# Patient Record
Sex: FEMALE | Race: WHITE | NOT HISPANIC OR LATINO
[De-identification: names, ages, dates, MRNs, and addresses within clinical notes are randomized per-mention and may not be internally consistent; named-entity substitution may affect disease eponyms.]

---

## 2021-09-21 ENCOUNTER — APPOINTMENT (OUTPATIENT)
Dept: OPHTHALMOLOGY | Facility: CLINIC | Age: 46
End: 2021-09-21

## 2023-10-04 ENCOUNTER — NON-APPOINTMENT (OUTPATIENT)
Age: 48
End: 2023-10-04

## 2023-10-10 ENCOUNTER — NON-APPOINTMENT (OUTPATIENT)
Age: 48
End: 2023-10-10

## 2023-10-11 ENCOUNTER — NON-APPOINTMENT (OUTPATIENT)
Age: 48
End: 2023-10-11

## 2023-10-11 ENCOUNTER — APPOINTMENT (OUTPATIENT)
Dept: GYNECOLOGIC ONCOLOGY | Facility: CLINIC | Age: 48
End: 2023-10-11
Payer: COMMERCIAL

## 2023-10-11 VITALS
WEIGHT: 135.25 LBS | DIASTOLIC BLOOD PRESSURE: 84 MMHG | BODY MASS INDEX: 23.09 KG/M2 | SYSTOLIC BLOOD PRESSURE: 139 MMHG | HEART RATE: 67 BPM | HEIGHT: 64 IN

## 2023-10-11 DIAGNOSIS — Z80.1 FAMILY HISTORY OF MALIGNANT NEOPLASM OF TRACHEA, BRONCHUS AND LUNG: ICD-10-CM

## 2023-10-11 PROCEDURE — 99204 OFFICE O/P NEW MOD 45 MIN: CPT

## 2023-10-12 ENCOUNTER — TRANSCRIPTION ENCOUNTER (OUTPATIENT)
Age: 48
End: 2023-10-12

## 2023-10-24 ENCOUNTER — OUTPATIENT (OUTPATIENT)
Dept: OUTPATIENT SERVICES | Facility: HOSPITAL | Age: 48
LOS: 1 days | End: 2023-10-24
Payer: COMMERCIAL

## 2023-10-24 ENCOUNTER — TRANSCRIPTION ENCOUNTER (OUTPATIENT)
Age: 48
End: 2023-10-24

## 2023-10-24 DIAGNOSIS — C80.1 MALIGNANT (PRIMARY) NEOPLASM, UNSPECIFIED: ICD-10-CM

## 2023-10-25 ENCOUNTER — RESULT REVIEW (OUTPATIENT)
Age: 48
End: 2023-10-25

## 2023-10-25 PROCEDURE — 88321 CONSLTJ&REPRT SLD PREP ELSWR: CPT

## 2023-10-25 PROCEDURE — 88175 CYTOPATH C/V AUTO FLUID REDO: CPT

## 2023-10-25 PROCEDURE — 88141 CYTOPATH C/V INTERPRET: CPT

## 2023-10-26 LAB
SURGICAL PATHOLOGY STUDY: SIGNIFICANT CHANGE UP
SURGICAL PATHOLOGY STUDY: SIGNIFICANT CHANGE UP

## 2023-10-27 ENCOUNTER — RESULT REVIEW (OUTPATIENT)
Age: 48
End: 2023-10-27

## 2023-11-09 ENCOUNTER — TRANSCRIPTION ENCOUNTER (OUTPATIENT)
Age: 48
End: 2023-11-09

## 2023-11-10 ENCOUNTER — OUTPATIENT (OUTPATIENT)
Dept: OUTPATIENT SERVICES | Facility: HOSPITAL | Age: 48
LOS: 1 days | End: 2023-11-10

## 2023-11-10 VITALS
WEIGHT: 128.97 LBS | SYSTOLIC BLOOD PRESSURE: 128 MMHG | RESPIRATION RATE: 15 BRPM | TEMPERATURE: 99 F | HEART RATE: 67 BPM | DIASTOLIC BLOOD PRESSURE: 84 MMHG | OXYGEN SATURATION: 99 % | HEIGHT: 63.5 IN

## 2023-11-10 DIAGNOSIS — Z90.49 ACQUIRED ABSENCE OF OTHER SPECIFIED PARTS OF DIGESTIVE TRACT: Chronic | ICD-10-CM

## 2023-11-10 DIAGNOSIS — D06.9 CARCINOMA IN SITU OF CERVIX, UNSPECIFIED: ICD-10-CM

## 2023-11-10 DIAGNOSIS — Z98.891 HISTORY OF UTERINE SCAR FROM PREVIOUS SURGERY: Chronic | ICD-10-CM

## 2023-11-10 DIAGNOSIS — Z90.89 ACQUIRED ABSENCE OF OTHER ORGANS: Chronic | ICD-10-CM

## 2023-11-10 DIAGNOSIS — J45.909 UNSPECIFIED ASTHMA, UNCOMPLICATED: ICD-10-CM

## 2023-11-10 LAB
ALBUMIN SERPL ELPH-MCNC: 4.3 G/DL — SIGNIFICANT CHANGE UP (ref 3.3–5)
ALBUMIN SERPL ELPH-MCNC: 4.3 G/DL — SIGNIFICANT CHANGE UP (ref 3.3–5)
ALP SERPL-CCNC: 72 U/L — SIGNIFICANT CHANGE UP (ref 40–120)
ALP SERPL-CCNC: 72 U/L — SIGNIFICANT CHANGE UP (ref 40–120)
ALT FLD-CCNC: 12 U/L — SIGNIFICANT CHANGE UP (ref 4–33)
ALT FLD-CCNC: 12 U/L — SIGNIFICANT CHANGE UP (ref 4–33)
ANION GAP SERPL CALC-SCNC: 9 MMOL/L — SIGNIFICANT CHANGE UP (ref 7–14)
ANION GAP SERPL CALC-SCNC: 9 MMOL/L — SIGNIFICANT CHANGE UP (ref 7–14)
AST SERPL-CCNC: 18 U/L — SIGNIFICANT CHANGE UP (ref 4–32)
AST SERPL-CCNC: 18 U/L — SIGNIFICANT CHANGE UP (ref 4–32)
BILIRUB SERPL-MCNC: 1.2 MG/DL — SIGNIFICANT CHANGE UP (ref 0.2–1.2)
BILIRUB SERPL-MCNC: 1.2 MG/DL — SIGNIFICANT CHANGE UP (ref 0.2–1.2)
BUN SERPL-MCNC: 9 MG/DL — SIGNIFICANT CHANGE UP (ref 7–23)
BUN SERPL-MCNC: 9 MG/DL — SIGNIFICANT CHANGE UP (ref 7–23)
CALCIUM SERPL-MCNC: 9.2 MG/DL — SIGNIFICANT CHANGE UP (ref 8.4–10.5)
CALCIUM SERPL-MCNC: 9.2 MG/DL — SIGNIFICANT CHANGE UP (ref 8.4–10.5)
CHLORIDE SERPL-SCNC: 104 MMOL/L — SIGNIFICANT CHANGE UP (ref 98–107)
CHLORIDE SERPL-SCNC: 104 MMOL/L — SIGNIFICANT CHANGE UP (ref 98–107)
CO2 SERPL-SCNC: 25 MMOL/L — SIGNIFICANT CHANGE UP (ref 22–31)
CO2 SERPL-SCNC: 25 MMOL/L — SIGNIFICANT CHANGE UP (ref 22–31)
CREAT SERPL-MCNC: 0.78 MG/DL — SIGNIFICANT CHANGE UP (ref 0.5–1.3)
CREAT SERPL-MCNC: 0.78 MG/DL — SIGNIFICANT CHANGE UP (ref 0.5–1.3)
EGFR: 95 ML/MIN/1.73M2 — SIGNIFICANT CHANGE UP
EGFR: 95 ML/MIN/1.73M2 — SIGNIFICANT CHANGE UP
GLUCOSE SERPL-MCNC: 84 MG/DL — SIGNIFICANT CHANGE UP (ref 70–99)
GLUCOSE SERPL-MCNC: 84 MG/DL — SIGNIFICANT CHANGE UP (ref 70–99)
HCG SERPL-ACNC: <1 MIU/ML — SIGNIFICANT CHANGE UP
HCG SERPL-ACNC: <1 MIU/ML — SIGNIFICANT CHANGE UP
HCT VFR BLD CALC: 38.8 % — SIGNIFICANT CHANGE UP (ref 34.5–45)
HCT VFR BLD CALC: 38.8 % — SIGNIFICANT CHANGE UP (ref 34.5–45)
HGB BLD-MCNC: 12.7 G/DL — SIGNIFICANT CHANGE UP (ref 11.5–15.5)
HGB BLD-MCNC: 12.7 G/DL — SIGNIFICANT CHANGE UP (ref 11.5–15.5)
MCHC RBC-ENTMCNC: 29.1 PG — SIGNIFICANT CHANGE UP (ref 27–34)
MCHC RBC-ENTMCNC: 29.1 PG — SIGNIFICANT CHANGE UP (ref 27–34)
MCHC RBC-ENTMCNC: 32.7 GM/DL — SIGNIFICANT CHANGE UP (ref 32–36)
MCHC RBC-ENTMCNC: 32.7 GM/DL — SIGNIFICANT CHANGE UP (ref 32–36)
MCV RBC AUTO: 88.8 FL — SIGNIFICANT CHANGE UP (ref 80–100)
MCV RBC AUTO: 88.8 FL — SIGNIFICANT CHANGE UP (ref 80–100)
NRBC # BLD: 0 /100 WBCS — SIGNIFICANT CHANGE UP (ref 0–0)
NRBC # BLD: 0 /100 WBCS — SIGNIFICANT CHANGE UP (ref 0–0)
NRBC # FLD: 0 K/UL — SIGNIFICANT CHANGE UP (ref 0–0)
NRBC # FLD: 0 K/UL — SIGNIFICANT CHANGE UP (ref 0–0)
PLATELET # BLD AUTO: 335 K/UL — SIGNIFICANT CHANGE UP (ref 150–400)
PLATELET # BLD AUTO: 335 K/UL — SIGNIFICANT CHANGE UP (ref 150–400)
POTASSIUM SERPL-MCNC: 4.5 MMOL/L — SIGNIFICANT CHANGE UP (ref 3.5–5.3)
POTASSIUM SERPL-MCNC: 4.5 MMOL/L — SIGNIFICANT CHANGE UP (ref 3.5–5.3)
POTASSIUM SERPL-SCNC: 4.5 MMOL/L — SIGNIFICANT CHANGE UP (ref 3.5–5.3)
POTASSIUM SERPL-SCNC: 4.5 MMOL/L — SIGNIFICANT CHANGE UP (ref 3.5–5.3)
PROT SERPL-MCNC: 6.9 G/DL — SIGNIFICANT CHANGE UP (ref 6–8.3)
PROT SERPL-MCNC: 6.9 G/DL — SIGNIFICANT CHANGE UP (ref 6–8.3)
RBC # BLD: 4.37 M/UL — SIGNIFICANT CHANGE UP (ref 3.8–5.2)
RBC # BLD: 4.37 M/UL — SIGNIFICANT CHANGE UP (ref 3.8–5.2)
RBC # FLD: 12.2 % — SIGNIFICANT CHANGE UP (ref 10.3–14.5)
RBC # FLD: 12.2 % — SIGNIFICANT CHANGE UP (ref 10.3–14.5)
SODIUM SERPL-SCNC: 138 MMOL/L — SIGNIFICANT CHANGE UP (ref 135–145)
SODIUM SERPL-SCNC: 138 MMOL/L — SIGNIFICANT CHANGE UP (ref 135–145)
WBC # BLD: 3.84 K/UL — SIGNIFICANT CHANGE UP (ref 3.8–10.5)
WBC # BLD: 3.84 K/UL — SIGNIFICANT CHANGE UP (ref 3.8–10.5)
WBC # FLD AUTO: 3.84 K/UL — SIGNIFICANT CHANGE UP (ref 3.8–10.5)
WBC # FLD AUTO: 3.84 K/UL — SIGNIFICANT CHANGE UP (ref 3.8–10.5)

## 2023-11-10 RX ORDER — SODIUM CHLORIDE 9 MG/ML
1000 INJECTION, SOLUTION INTRAVENOUS
Refills: 0 | Status: DISCONTINUED | OUTPATIENT
Start: 2023-11-13 | End: 2023-11-27

## 2023-11-10 NOTE — H&P PST ADULT - NSANTHAGERD_ENT_A_CORE
RX PROGRESS NOTE: Vancomycin Therapeutic Drug Monitoring      Indication for therapy: Non-necrotizing SSTI    ALLERGIES:  No Known Allergies    Most recent height and weight information:  Weight: 117.8 kg (11/28/22 2000)  Height: 5' 7\" (170.2 cm) (11/28/22 2000)    The Following are the calculated  Current Weights for Kika Dubon     Adjusted Ideal    84.1 kg 61.6 kg             Labs:  Serum Creatinine and Creatinine Clearance:  Serum creatinine: 0.8 mg/dL 11/28/22 1508  Estimated creatinine clearance: 89.4 mL/min    Maximum Temperature (last 24 hours)     Value Max    Temp 101.3 °F (38.5 °C)        WBC (K/mcL)   Date/Time Value   11/28/2022 1508 16.0 (H)     Microbiology Results     None            Assessment/Plan:  Briefly, this is a 70 year old female started on vancomycin for Non-necrotizing SSTI, with a target serum trough concentration of 10-15 mcg/mL.  Patient received a dose of vancomycin 1750 mg at 1741.  Initial dosing regimen will be 1500 mg every 12 hours (maintenance dose).    Pharmacy will monitor levels as appropriate.    Pharmacy will continue to monitor patient (renal function, microbiology data, risk factors for adverse events, appropriate duration of therapy), will order/monitor serum levels as appropriate, and will adjust dose if/when necessary.      Thank you,    Feliz Cervantes, PHARMD  11/28/2022 11:11 PM     No

## 2023-11-10 NOTE — H&P PST ADULT - HISTORY OF PRESENT ILLNESS
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

## 2023-11-10 NOTE — H&P PST ADULT - GENITOURINARY COMMENTS
patient reports of post coital bleeding- s/p sono gram found to have cervical polyps and abnormal PAP smear, s/p colposcopy pre op diagnosis endocervical carcinoma

## 2023-11-10 NOTE — H&P PST ADULT - PROBLEM SELECTOR PLAN 1
Patient is tentatively scheduled cold knife cone biopsy with Dr Mar on 11/13/23    Pre-op instructions provided. Pt given verbal and written instructions with teach back on pepcid. Pt verbalized understanding with return demonstration.    Urine cup provided for day of procedure pregnancy test.    CBC,CMP,HCG sent

## 2023-11-10 NOTE — ASU PATIENT PROFILE, ADULT - FALL HARM RISK - UNIVERSAL INTERVENTIONS
Bed in lowest position, wheels locked, appropriate side rails in place/Call bell, personal items and telephone in reach/Instruct patient to call for assistance before getting out of bed or chair/Non-slip footwear when patient is out of bed/[place] to call system/Physically safe environment - no spills, clutter or unnecessary equipment/Purposeful Proactive Rounding/Room/bathroom lighting operational, light cord in reach

## 2023-11-10 NOTE — H&P PST ADULT - ASSESSMENT
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

## 2023-11-10 NOTE — H&P PST ADULT - NSANTHOSAYNRD_GEN_A_CORE
No. MAGNOLIA screening performed.  STOP BANG Legend: 0-2 = LOW Risk; 3-4 = INTERMEDIATE Risk; 5-8 = HIGH Risk

## 2023-11-12 ENCOUNTER — TRANSCRIPTION ENCOUNTER (OUTPATIENT)
Age: 48
End: 2023-11-12

## 2023-11-13 ENCOUNTER — RESULT REVIEW (OUTPATIENT)
Age: 48
End: 2023-11-13

## 2023-11-13 ENCOUNTER — TRANSCRIPTION ENCOUNTER (OUTPATIENT)
Age: 48
End: 2023-11-13

## 2023-11-13 ENCOUNTER — APPOINTMENT (OUTPATIENT)
Dept: GYNECOLOGIC ONCOLOGY | Facility: HOSPITAL | Age: 48
End: 2023-11-13

## 2023-11-13 ENCOUNTER — OUTPATIENT (OUTPATIENT)
Dept: OUTPATIENT SERVICES | Facility: HOSPITAL | Age: 48
LOS: 1 days | Discharge: HOME | End: 2023-11-13
Payer: COMMERCIAL

## 2023-11-13 VITALS
RESPIRATION RATE: 15 BRPM | OXYGEN SATURATION: 100 % | HEART RATE: 68 BPM | DIASTOLIC BLOOD PRESSURE: 73 MMHG | SYSTOLIC BLOOD PRESSURE: 128 MMHG | TEMPERATURE: 97 F

## 2023-11-13 VITALS
TEMPERATURE: 98 F | HEIGHT: 63.5 IN | WEIGHT: 128.97 LBS | OXYGEN SATURATION: 100 % | SYSTOLIC BLOOD PRESSURE: 105 MMHG | RESPIRATION RATE: 14 BRPM | DIASTOLIC BLOOD PRESSURE: 66 MMHG | HEART RATE: 78 BPM

## 2023-11-13 DIAGNOSIS — Z98.891 HISTORY OF UTERINE SCAR FROM PREVIOUS SURGERY: Chronic | ICD-10-CM

## 2023-11-13 DIAGNOSIS — D06.9 CARCINOMA IN SITU OF CERVIX, UNSPECIFIED: ICD-10-CM

## 2023-11-13 DIAGNOSIS — Z90.89 ACQUIRED ABSENCE OF OTHER ORGANS: Chronic | ICD-10-CM

## 2023-11-13 DIAGNOSIS — Z90.49 ACQUIRED ABSENCE OF OTHER SPECIFIED PARTS OF DIGESTIVE TRACT: Chronic | ICD-10-CM

## 2023-11-13 LAB
HCG UR QL: NEGATIVE — SIGNIFICANT CHANGE UP
HCG UR QL: NEGATIVE — SIGNIFICANT CHANGE UP

## 2023-11-13 PROCEDURE — 57520 CONIZATION OF CERVIX: CPT

## 2023-11-13 PROCEDURE — 88341 IMHCHEM/IMCYTCHM EA ADD ANTB: CPT | Mod: 26

## 2023-11-13 PROCEDURE — 88307 TISSUE EXAM BY PATHOLOGIST: CPT | Mod: 26

## 2023-11-13 PROCEDURE — 88305 TISSUE EXAM BY PATHOLOGIST: CPT | Mod: 26

## 2023-11-13 PROCEDURE — 88360 TUMOR IMMUNOHISTOCHEM/MANUAL: CPT | Mod: 26,59

## 2023-11-13 PROCEDURE — 88342 IMHCHEM/IMCYTCHM 1ST ANTB: CPT | Mod: 26

## 2023-11-13 RX ORDER — OXYCODONE HYDROCHLORIDE 5 MG/1
10 TABLET ORAL ONCE
Refills: 0 | Status: DISCONTINUED | OUTPATIENT
Start: 2023-11-13 | End: 2023-11-13

## 2023-11-13 RX ORDER — OXYCODONE HYDROCHLORIDE 5 MG/1
1 TABLET ORAL
Qty: 5 | Refills: 0
Start: 2023-11-13

## 2023-11-13 RX ORDER — OXYCODONE HYDROCHLORIDE 5 MG/1
5 TABLET ORAL ONCE
Refills: 0 | Status: DISCONTINUED | OUTPATIENT
Start: 2023-11-13 | End: 2023-11-13

## 2023-11-13 NOTE — BRIEF OPERATIVE NOTE - OPERATION/FINDINGS
Exam under anesthesia revealed anteverted uterus, 6wk size, adnexa nonpalpable bilaterally, vulva grossly normal.

## 2023-11-13 NOTE — ASU DISCHARGE PLAN (ADULT/PEDIATRIC) - ASU DC SPECIAL INSTRUCTIONSFT
Discharge Instructions:  1. Diet: advance as tolerated  2. Activity limited by:   - No running, heavy lifting, strenuous exercise,   - Nothing in vagina (bath, swim, intercourse, douching, tampons) for 6-8 weeks  3. Medications:   - Senna to prevent constipation (please hold for loose stools)  - Ibuprofen 800mg every 6 hours as needed for pain  - Acetaminophen 975 mg every 6 hours as needed for pain   4. Follow-up appointment - 2 weeks. Please call the office upon discharge to schedule your appointment if you do not have one already.   5. Precautions:  - Call the office or go to the ED if you have any of the followin) Fever >100.4 that does not resolve  2) Intractable pain  3) Heavy bleeding

## 2023-11-13 NOTE — BRIEF OPERATIVE NOTE - NSICDXBRIEFPOSTOP_GEN_ALL_CORE_FT
POST-OP DIAGNOSIS:  Adenocarcinoma in situ (AIS) of uterine cervix 13-Nov-2023 09:04:22  Francoise Bhatt

## 2023-11-13 NOTE — ASU DISCHARGE PLAN (ADULT/PEDIATRIC) - CARE PROVIDER_API CALL
Lacey Mar  Gynecologic Oncology  [address] [place] [id number]  Phone: [phone]  Fax: [phone]  Scheduled Appointment: 11/29/2023 04:00 PM

## 2023-11-13 NOTE — ASU DISCHARGE PLAN (ADULT/PEDIATRIC) - NURSING INSTRUCTIONS
DO NOT take any Tylenol (Acetaminophen) or narcotics containing Tylenol until after 2pm . You received Tylenol during your operation and it can cause damage to your liver if too much is taken within a 24 hour time period.

## 2023-11-13 NOTE — ASU DISCHARGE PLAN (ADULT/PEDIATRIC) - NS MD DC FALL RISK RISK
For information on Fall & Injury Prevention, visit: [email or web address] OR  [email or web address] OR  https://www.cdc.gov/steadi/patient.html

## 2023-11-13 NOTE — ASU DISCHARGE PLAN (ADULT/PEDIATRIC) - B. DRINK ALCOHOL, BEER, OR WINE
Medications reviewed with patient and recorded  Denies known Latex allergy or symptoms of Latex sensitivity.  Pt comes in today for f/u rt knee.  Pt states that his knee is doing better. Pt states that it is hard to say if it the injection helped.   Statement Selected

## 2023-11-13 NOTE — BRIEF OPERATIVE NOTE - NSICDXBRIEFPREOP_GEN_ALL_CORE_FT
PRE-OP DIAGNOSIS:  Adenocarcinoma in situ (AIS) of uterine cervix 13-Nov-2023 09:04:19  Francoise Bhatt

## 2023-11-13 NOTE — ASU PREOP CHECKLIST - NS PREOP CHK CHLOROHEX WASH
Chief Complaint   Patient presents with     Allergies     /74 (Cuff Size: Adult Large)   Pulse 87   Temp 98.2  F (36.8  C) (Tympanic)   Resp 20   Ht 1.829 m (6')   Wt 110.7 kg (244 lb)   SpO2 97%   BMI 33.09 kg/m   Estimated body mass index is 33.09 kg/m  as calculated from the following:    Height as of this encounter: 1.829 m (6').    Weight as of this encounter: 110.7 kg (244 lb).  Patient presents to the clinic using No DME      Health Maintenance that is potentially due pending provider review:    Health Maintenance Due   Topic Date Due     YEARLY PREVENTIVE VISIT  Never done     ADVANCE CARE PLANNING  Never done     HEPATITIS B IMMUNIZATION (1 of 3 - 3-dose series) Never done     ZOSTER IMMUNIZATION (1 of 2) Never done     COVID-19 Vaccine (4 - Booster for Moderna series) 07/20/2022     INFLUENZA VACCINE (1) 09/01/2022                
N/A

## 2023-11-15 ENCOUNTER — NON-APPOINTMENT (OUTPATIENT)
Age: 48
End: 2023-11-15

## 2023-11-22 PROBLEM — C53.0 ENDOCERVICAL ADENOCARCINOMA: Status: ACTIVE | Noted: 2023-10-04

## 2023-11-29 ENCOUNTER — APPOINTMENT (OUTPATIENT)
Dept: GYNECOLOGIC ONCOLOGY | Facility: CLINIC | Age: 48
End: 2023-11-29
Payer: COMMERCIAL

## 2023-11-29 VITALS
SYSTOLIC BLOOD PRESSURE: 140 MMHG | HEIGHT: 64 IN | DIASTOLIC BLOOD PRESSURE: 83 MMHG | TEMPERATURE: 97.9 F | WEIGHT: 135 LBS | BODY MASS INDEX: 23.05 KG/M2 | HEART RATE: 73 BPM

## 2023-11-29 DIAGNOSIS — C53.0 MALIGNANT NEOPLASM OF ENDOCERVIX: ICD-10-CM

## 2023-11-29 LAB
SURGICAL PATHOLOGY STUDY: SIGNIFICANT CHANGE UP
SURGICAL PATHOLOGY STUDY: SIGNIFICANT CHANGE UP

## 2023-11-29 PROCEDURE — 99213 OFFICE O/P EST LOW 20 MIN: CPT | Mod: 24

## 2023-11-30 PROBLEM — G45.9 TRANSIENT CEREBRAL ISCHEMIC ATTACK, UNSPECIFIED: Chronic | Status: ACTIVE | Noted: 2023-11-10

## 2023-12-07 ENCOUNTER — OUTPATIENT (OUTPATIENT)
Dept: OUTPATIENT SERVICES | Facility: HOSPITAL | Age: 48
LOS: 1 days | End: 2023-12-07

## 2023-12-07 VITALS
WEIGHT: 125 LBS | DIASTOLIC BLOOD PRESSURE: 88 MMHG | OXYGEN SATURATION: 99 % | HEIGHT: 64 IN | RESPIRATION RATE: 14 BRPM | HEART RATE: 70 BPM | SYSTOLIC BLOOD PRESSURE: 140 MMHG | TEMPERATURE: 98 F

## 2023-12-07 DIAGNOSIS — Z90.49 ACQUIRED ABSENCE OF OTHER SPECIFIED PARTS OF DIGESTIVE TRACT: Chronic | ICD-10-CM

## 2023-12-07 DIAGNOSIS — D06.9 CARCINOMA IN SITU OF CERVIX, UNSPECIFIED: ICD-10-CM

## 2023-12-07 DIAGNOSIS — Z90.89 ACQUIRED ABSENCE OF OTHER ORGANS: Chronic | ICD-10-CM

## 2023-12-07 DIAGNOSIS — Z98.891 HISTORY OF UTERINE SCAR FROM PREVIOUS SURGERY: Chronic | ICD-10-CM

## 2023-12-07 LAB
A1C WITH ESTIMATED AVERAGE GLUCOSE RESULT: 4.6 % — SIGNIFICANT CHANGE UP (ref 4–5.6)
A1C WITH ESTIMATED AVERAGE GLUCOSE RESULT: 4.6 % — SIGNIFICANT CHANGE UP (ref 4–5.6)
ANION GAP SERPL CALC-SCNC: 9 MMOL/L — SIGNIFICANT CHANGE UP (ref 7–14)
ANION GAP SERPL CALC-SCNC: 9 MMOL/L — SIGNIFICANT CHANGE UP (ref 7–14)
BLD GP AB SCN SERPL QL: NEGATIVE — SIGNIFICANT CHANGE UP
BLD GP AB SCN SERPL QL: NEGATIVE — SIGNIFICANT CHANGE UP
BUN SERPL-MCNC: 9 MG/DL — SIGNIFICANT CHANGE UP (ref 7–23)
BUN SERPL-MCNC: 9 MG/DL — SIGNIFICANT CHANGE UP (ref 7–23)
CALCIUM SERPL-MCNC: 9.1 MG/DL — SIGNIFICANT CHANGE UP (ref 8.4–10.5)
CALCIUM SERPL-MCNC: 9.1 MG/DL — SIGNIFICANT CHANGE UP (ref 8.4–10.5)
CHLORIDE SERPL-SCNC: 102 MMOL/L — SIGNIFICANT CHANGE UP (ref 98–107)
CHLORIDE SERPL-SCNC: 102 MMOL/L — SIGNIFICANT CHANGE UP (ref 98–107)
CO2 SERPL-SCNC: 28 MMOL/L — SIGNIFICANT CHANGE UP (ref 22–31)
CO2 SERPL-SCNC: 28 MMOL/L — SIGNIFICANT CHANGE UP (ref 22–31)
CREAT SERPL-MCNC: 0.71 MG/DL — SIGNIFICANT CHANGE UP (ref 0.5–1.3)
CREAT SERPL-MCNC: 0.71 MG/DL — SIGNIFICANT CHANGE UP (ref 0.5–1.3)
EGFR: 106 ML/MIN/1.73M2 — SIGNIFICANT CHANGE UP
EGFR: 106 ML/MIN/1.73M2 — SIGNIFICANT CHANGE UP
ESTIMATED AVERAGE GLUCOSE: 85 — SIGNIFICANT CHANGE UP
ESTIMATED AVERAGE GLUCOSE: 85 — SIGNIFICANT CHANGE UP
GLUCOSE SERPL-MCNC: 73 MG/DL — SIGNIFICANT CHANGE UP (ref 70–99)
GLUCOSE SERPL-MCNC: 73 MG/DL — SIGNIFICANT CHANGE UP (ref 70–99)
HCG UR QL: NEGATIVE — SIGNIFICANT CHANGE UP
HCG UR QL: NEGATIVE — SIGNIFICANT CHANGE UP
HCT VFR BLD CALC: 38.9 % — SIGNIFICANT CHANGE UP (ref 34.5–45)
HCT VFR BLD CALC: 38.9 % — SIGNIFICANT CHANGE UP (ref 34.5–45)
HGB BLD-MCNC: 12.6 G/DL — SIGNIFICANT CHANGE UP (ref 11.5–15.5)
HGB BLD-MCNC: 12.6 G/DL — SIGNIFICANT CHANGE UP (ref 11.5–15.5)
MCHC RBC-ENTMCNC: 28.8 PG — SIGNIFICANT CHANGE UP (ref 27–34)
MCHC RBC-ENTMCNC: 28.8 PG — SIGNIFICANT CHANGE UP (ref 27–34)
MCHC RBC-ENTMCNC: 32.4 GM/DL — SIGNIFICANT CHANGE UP (ref 32–36)
MCHC RBC-ENTMCNC: 32.4 GM/DL — SIGNIFICANT CHANGE UP (ref 32–36)
MCV RBC AUTO: 89 FL — SIGNIFICANT CHANGE UP (ref 80–100)
MCV RBC AUTO: 89 FL — SIGNIFICANT CHANGE UP (ref 80–100)
NRBC # BLD: 0 /100 WBCS — SIGNIFICANT CHANGE UP (ref 0–0)
NRBC # BLD: 0 /100 WBCS — SIGNIFICANT CHANGE UP (ref 0–0)
NRBC # FLD: 0 K/UL — SIGNIFICANT CHANGE UP (ref 0–0)
NRBC # FLD: 0 K/UL — SIGNIFICANT CHANGE UP (ref 0–0)
PLATELET # BLD AUTO: 397 K/UL — SIGNIFICANT CHANGE UP (ref 150–400)
PLATELET # BLD AUTO: 397 K/UL — SIGNIFICANT CHANGE UP (ref 150–400)
POTASSIUM SERPL-MCNC: 4.1 MMOL/L — SIGNIFICANT CHANGE UP (ref 3.5–5.3)
POTASSIUM SERPL-MCNC: 4.1 MMOL/L — SIGNIFICANT CHANGE UP (ref 3.5–5.3)
POTASSIUM SERPL-SCNC: 4.1 MMOL/L — SIGNIFICANT CHANGE UP (ref 3.5–5.3)
POTASSIUM SERPL-SCNC: 4.1 MMOL/L — SIGNIFICANT CHANGE UP (ref 3.5–5.3)
RBC # BLD: 4.37 M/UL — SIGNIFICANT CHANGE UP (ref 3.8–5.2)
RBC # BLD: 4.37 M/UL — SIGNIFICANT CHANGE UP (ref 3.8–5.2)
RBC # FLD: 12.5 % — SIGNIFICANT CHANGE UP (ref 10.3–14.5)
RBC # FLD: 12.5 % — SIGNIFICANT CHANGE UP (ref 10.3–14.5)
RH IG SCN BLD-IMP: POSITIVE — SIGNIFICANT CHANGE UP
SODIUM SERPL-SCNC: 139 MMOL/L — SIGNIFICANT CHANGE UP (ref 135–145)
SODIUM SERPL-SCNC: 139 MMOL/L — SIGNIFICANT CHANGE UP (ref 135–145)
WBC # BLD: 5.08 K/UL — SIGNIFICANT CHANGE UP (ref 3.8–10.5)
WBC # BLD: 5.08 K/UL — SIGNIFICANT CHANGE UP (ref 3.8–10.5)
WBC # FLD AUTO: 5.08 K/UL — SIGNIFICANT CHANGE UP (ref 3.8–10.5)
WBC # FLD AUTO: 5.08 K/UL — SIGNIFICANT CHANGE UP (ref 3.8–10.5)

## 2023-12-07 RX ORDER — ACETAMINOPHEN 500 MG
2 TABLET ORAL
Qty: 0 | Refills: 0 | DISCHARGE

## 2023-12-07 RX ORDER — SODIUM CHLORIDE 9 MG/ML
1000 INJECTION, SOLUTION INTRAVENOUS
Refills: 0 | Status: DISCONTINUED | OUTPATIENT
Start: 2023-12-14 | End: 2023-12-14

## 2023-12-07 NOTE — H&P PST ADULT - NSICDXPASTMEDICALHX_GEN_ALL_CORE_FT
PAST MEDICAL HISTORY:  Asthma     TIA (transient ischemic attack)      PAST MEDICAL HISTORY:  Childhood asthma     TIA (transient ischemic attack)

## 2023-12-07 NOTE — H&P PST ADULT - RESPIRATORY
clear to auscultation bilaterally/no wheezes/no rales/no rhonchi/airway patent clear to auscultation bilaterally/no wheezes/no rales/no rhonchi/no respiratory distress/no use of accessory muscles/no subcutaneous emphysema/airway patent/breath sounds equal/good air movement/respirations non-labored

## 2023-12-07 NOTE — H&P PST ADULT - CARDIOVASCULAR
regular rate and rhythm/S1 S2 present/vascular details… normal/regular rate and rhythm/S1 S2 present/no rub/no murmur/no JVD/no pedal edema

## 2023-12-07 NOTE — H&P PST ADULT - NS PRO FEM REPRO PAP RESULTS
Encounter Date: 3/27/2022    SCRIBE #1 NOTE: I, Luiza Jorden, am scribing for, and in the presence of,  Ashish Venegas PA-C. I have scribed the following portions of the note - Other sections scribed: HPI, ROS, PE.       History     Chief Complaint   Patient presents with    Abdominal Pain    Nausea    Vomiting     Patient reports abdominal pain with multiple episodes of Vomiting and loose stools for the past week. Saw PCP on Monday      HPI:  54 year old female with PMHx of HTN, DM, and pancreatitis presents to the ED complaining of epigastric abdominal pain with associated decreased appetite, nausea, vomiting, and diarrhea for one week. The patient states that she is unable to hold down solids. She reports that her abdominal pain is exacerbated by eating. States that the pain is similar to previous pancreatitis instances. Also reports difficulty urinating and mild shortness of breath. Reports history of asthma.The patient also endorses sneezing, cough, and congestion that began today. She denies black or bloody stool, fever, chest pain, dysuria, hematuria, or other symptoms at this time. No known sick contacts noted. No medications were taken prior to arrival. Abdominal surgical history includes hysterectomy and cholecystectomy.     The history is provided by the patient. No  was used.     Review of patient's allergies indicates:   Allergen Reactions    Latex, natural rubber Itching and Rash    Lidocaine Rash    Tramadol Hives and Rash     Past Medical History:   Diagnosis Date    Anemia     Celiac disease     Diabetes mellitus     Gall stones     Hypertension     PTSD (post-traumatic stress disorder)     Supraventricular tachycardia     Yeast infection      Past Surgical History:   Procedure Laterality Date    ABDOMINAL SURGERY      Gastric sleeve    ANKLE FUSION  2011    BREAST SURGERY       SECTION      placenta previa    CHOLECYSTECTOMY  2009    EXCISIONAL  BIOPSY Left 7/26/2019    Procedure: EXCISIONAL BIOPSY LEFT with SEED (CONSENT AM OF) 1.0 hr case;  Surgeon: Flor Rosas MD;  Location: [facility] OR [address] [place] [address];  Service: General;  Laterality: Left;    gastric      gastric sleeve      pt reported     HERNIA REPAIR      HYSTERECTOMY  09/19/2001    Inguinal hernia  2004    SKIN SURGERY      Kasi Gómez     Family History   Problem Relation Age of Onset    Heart disease Father     Diabetes Mother         hypoglycemic    Breast cancer Maternal Aunt     Breast cancer Sister     Breast cancer Paternal Aunt     Ovarian cancer Neg Hx      Social History     Tobacco Use    Smoking status: Never Smoker    Smokeless tobacco: Never Used   Substance Use Topics    Alcohol use: Yes     Comment: rarely    Drug use: No     Review of Systems   Constitutional: Positive for appetite change (decreased). Negative for fever.   HENT: Positive for congestion and sneezing.    Eyes: Negative for visual disturbance.   Respiratory: Positive for cough and shortness of breath.    Cardiovascular: Negative for chest pain.   Gastrointestinal: Positive for abdominal pain, diarrhea, nausea and vomiting. Negative for blood in stool.   Genitourinary: Positive for difficulty urinating. Negative for dysuria and hematuria.   Musculoskeletal: Negative for back pain and myalgias.   Skin: Negative for rash.   Neurological: Negative for headaches.       Physical Exam     Initial Vitals [03/27/22 0853]   BP Pulse Resp Temp SpO2   133/72 (!) 112 18 98.7 °F (37.1 °C) 99 %      MAP       --         Physical Exam    Nursing note and vitals reviewed.  Constitutional: She appears well-developed and well-nourished. She is not diaphoretic. No distress.   HENT:   Head: Normocephalic and atraumatic.   Nose: Nose normal.   Eyes: Conjunctivae and EOM are normal.   Neck:   Normal range of motion.  Cardiovascular: Normal rate and regular rhythm.   Pulmonary/Chest: No respiratory distress.   Faint diffuse expiratory  wheezing   Abdominal: There is abdominal tenderness.   Generalized tenderness most prominent at the epigastrium. S/p cholecystectomy.   Musculoskeletal:         General: No tenderness or edema.      Cervical back: Normal range of motion.     Neurological: She is alert and oriented to person, place, and time.   Skin: Skin is warm and dry. No erythema. No pallor.   Psychiatric: She has a normal mood and affect. Thought content normal.         ED Course   Procedures  Labs Reviewed   URINALYSIS, REFLEX TO URINE CULTURE - Abnormal; Notable for the following components:       Result Value    Appearance, UA Hazy (*)     Protein, UA Trace (*)     All other components within normal limits    Narrative:     Specimen Source->Urine   COMPREHENSIVE METABOLIC PANEL - Abnormal; Notable for the following components:    Total Protein 8.8 (*)     All other components within normal limits   CBC W/ AUTO DIFFERENTIAL   LIPASE   TROPONIN I   B-TYPE NATRIURETIC PEPTIDE   BETA - HYDROXYBUTYRATE, SERUM   SARS-COV-2 RDRP GENE    Narrative:     This test utilizes isothermal nucleic acid amplification   technology to detect the SARS-CoV-2 RdRp nucleic acid segment.   The analytical sensitivity (limit of detection) is 125 genome   equivalents/mL.   A POSITIVE result implies infection with the SARS-CoV-2 virus;   the patient is presumed to be contagious.     A NEGATIVE result means that SARS-CoV-2 nucleic acids are not   present above the limit of detection. A NEGATIVE result should be   treated as presumptive. It does not rule out the possibility of   COVID-19 and should not be the sole basis for treatment decisions.   If COVID-19 is strongly suspected based on clinical and exposure   history, re-testing using an alternate molecular assay should be   considered.   This test is only for use under the Food and Drug   Administration s Emergency Use Authorization (EUA).   Commercial kits are provided by [employer].   Performance  characteristics of the EUA have been independently   verified by [facility] Department of   Pathology and Laboratory Medicine.   _________________________________________________________________   The authorized Fact Sheet for Healthcare Providers and the authorized Fact   Sheet for Patients of the ID NOW COVID-19 are available on the FDA   website:     https://www.fda.gov/media/886668/download  https://www.fda.gov/media/781384/download          POCT INFLUENZA A/B MOLECULAR     EKG Readings: (Independently Interpreted)   Initial Reading: No STEMI. Rhythm: Normal Sinus Rhythm. Heart Rate: 100. Axis: Normal.       Imaging Results          X-Ray Chest AP Portable (Final result)  Result time 03/27/22 10:20:33    Final result by Peewee Prasad MD (03/27/22 10:20:33)                 Impression:      No acute abnormality.      Electronically signed by: Peewee Prasad MD  Date:    03/27/2022  Time:    10:20             Narrative:    EXAMINATION:  XR CHEST AP PORTABLE    CLINICAL HISTORY:  Shortness of breath    TECHNIQUE:  Single frontal view of the chest was performed.    COMPARISON:  Prior exams dating back to 2013    FINDINGS:  Lungs are clear.  No effusion or pneumothorax.    No acute bone abnormality.                                 Medications   sodium chloride 0.9% bolus 1,000 mL (1,000 mLs Intravenous New Bag 3/27/22 0920)   ondansetron injection 8 mg (8 mg Intravenous Given 3/27/22 0957)   famotidine (PF) injection 40 mg (40 mg Intravenous Given 3/27/22 1001)   dicyclomine capsule 20 mg (20 mg Oral Given 3/27/22 1003)     Medical Decision Making:   History:   Old Medical Records: I decided to obtain old medical records.  ED Management:  Multiple complaints.  Completely asymptomatic after fluids and supportive care in the ED.  vitals normalized.  Tolerating p.o. and feels comfortable going home.  Post cholecystectomy.  No evidence of acute pancreatitis.  I doubt perforated ulcer and no symptoms of  bleeding ulcer.  I carefully considered but have lower suspicion for pulmonary embolism.  Low risk for ACS.  No evidence of pneumonia, pneumothorax, effusion, or widening mediastinum.  She does have mild wheezing on exam with in the setting of asthma; will offer supportive measures.  No hypoxia or respiratory distress.  COVID-19 flu negative.  Not anemic.  No electrolyte or metabolic disturbance.  No acute kidney injury.  No hyperglycemia. I am unsure of the etiology of this patient's symptoms, however, I do not believe they are of emergent/life threatening etiology at this time.  Advising follow-up with PCP. Strict return precautions discussed.  Agreeable to plan.          Scribe Attestation:   Scribe #1: I performed the above scribed service and the documentation accurately describes the services I performed. I attest to the accuracy of the note.                 Clinical Impression:   Final diagnoses:  [R06.02] SOB (shortness of breath)  [R11.2, R19.7] Nausea vomiting and diarrhea (Primary)  [R10.13] Epigastric abdominal pain  [Z87.09] History of asthma       I, Ashish Venegas PA-C, personally performed the services described in this documentation. All medical record entries made by the scribe were at my direction and in my presence. I have reviewed the chart and agree that the record reflects my personal performance and is accurate and complete.   ED Disposition Condition    Discharge Stable        ED Prescriptions     Medication Sig Dispense Start Date End Date Auth. Provider    ondansetron (ZOFRAN) 4 MG tablet Take 2 tablets (8 mg total) by mouth every 6 (six) hours as needed for Nausea. 30 tablet 3/27/2022 4/26/2022 Ashish Venegas PA-C    albuterol (PROVENTIL/VENTOLIN HFA) 90 mcg/actuation inhaler Inhale 1-2 puffs into the lungs every 6 (six) hours as needed for Wheezing. Rescue 6.7 g 3/27/2022  Ashish Venegas PA-C    predniSONE (DELTASONE) 20 MG tablet Take 2 tablets (40 mg total) by mouth once daily.  for 3 days 6 tablet 3/27/2022 3/30/2022 Ashish Venegas PA-C        Follow-up Information     Follow up With Specialties Details Why Contact Info    Radha Jones MD Family Medicine Schedule an appointment as soon as possible for a visit in 1 day For re-evaluation [address]  [address]  [facility] [address]  [phone]      [facility] - Emergency Dept Emergency Medicine Go to  If symptoms worsen [address] [place] [facility] [address] [phone]           Ashish Venegas PA-C  03/27/22 1118     abnormal

## 2023-12-07 NOTE — H&P PST ADULT - RESPIRATORY AND THORAX COMMENTS
Asthma very well controlled last use of rescue inhaler an year ago hx of childhood asthma, uses inhaler with bronchitis

## 2023-12-07 NOTE — H&P PST ADULT - PROBLEM SELECTOR PLAN 1
Pt scheduled for robotic assisted total laparoscopic hysterectomy, bilateral salpingectomy on 12/14/20023.  labs done results pending, Urine cup provided.  Chlorhexidine provided-  written and verbal instructions given, pt able to verbalize understanding.  Preop teaching done, pt able to verbalize understanding.   medication day of procedure-  pepcid

## 2023-12-07 NOTE — H&P PST ADULT - BP NONINVASIVE MEAN (MM HG)
[employer] Palliative Medicine Office  Nursing Note  [phone]  Fax [phone]      Name:  Rene Junior  YOB: 1942    Received outpatient Palliative Medicine referral from Dr. Josef Montgomery to see pt for symptom management and supportive care. Chart  reviewed. Rene Junior is a 70 y.o. female with right breast cancer with liver, lung, and bone mets. Pt's most recent office visit with Dr. Wood Boucher was on 11/23/20. See his note for complete HPI, treatment history, and plan. ACP:     POST form and [facility] AMD signed on 9/24/20 are on file. Nurse called pt to introduce outpatient Palliative Medicine services, no answer, left message requesting call back. Addendum:  4:15pm  Pt returned call. Nurse explained outpatient Palliative Medicine services and the role of Palliative Medicine (\"extra layer of support\", symptom management, care decisions, improving quality of life, etc.)  Pt states she does not remember being told that she was referred to Palliative Medicine. This nurse explained that the referral was placed after she called Dr. Cherylene Blinks office on 11/30/20 stating the tramadol was not relieving the pain. She says the oxycodone 5mg IR that Dr. Wood Boucher prescribed on 11/30/20 is working well. Pt states she has an appt with Dr. Wood Boucher next week and she would like to discuss the Palliative referral with him before scheduling an appt. 105

## 2023-12-07 NOTE — H&P PST ADULT - GENITOURINARY COMMENTS
patient reports of post coital bleeding- s/p sono gram found to have cervical polyps and abnormal PAP smear, s/p colposcopy pre op diagnosis endocervical carcinoma endocervical carcinoma in situ declined

## 2023-12-07 NOTE — H&P PST ADULT - NEGATIVE GENERAL GENITOURINARY SYMPTOMS
no hematuria/no renal colic/no flank pain L/no flank pain R/no urine discoloration/no gas in urine no hematuria/no renal colic/no flank pain L/no flank pain R/no urine discoloration/no gas in urine/no bladder infections/no dysuria/normal urinary frequency

## 2023-12-07 NOTE — H&P PST ADULT - MUSCULOSKELETAL
negative ROM intact/normal gait/strength 5/5 bilateral upper extremities ROM intact/normal gait/strength 5/5 bilateral upper extremities/strength 5/5 bilateral lower extremities

## 2023-12-07 NOTE — H&P PST ADULT - NEGATIVE GASTROINTESTINAL SYMPTOMS
no nausea/no vomiting/no diarrhea no nausea/no vomiting/no diarrhea/no constipation no nausea/no vomiting/no diarrhea/no constipation/no abdominal pain

## 2023-12-07 NOTE — H&P PST ADULT - HISTORY OF PRESENT ILLNESS
47y/o female scheduled for robotic assisted total laparoscopic hysterectomy, bilateral salpingectomy on 12/14/20023.  Pt states,   49y/o female scheduled for robotic assisted total laparoscopic hysterectomy, bilateral salpingectomy on 12/14/20023.  Pt states,   47y/o female scheduled for robotic assisted total laparoscopic hysterectomy, bilateral salpingectomy on 12/14/20023.  Pt states,  "dx with cervical and endometrial carcinoma in situ." 49y/o female scheduled for robotic assisted total laparoscopic hysterectomy, bilateral salpingectomy on 12/14/20023.  Pt states,  "dx with cervical and endometrial carcinoma in situ."

## 2023-12-07 NOTE — H&P PST ADULT - NEUROLOGICAL COMMENTS
patient reports of history of TIA during her pregnancy in 2006 patient reports of history of TIA during her pregnancy in 2006 unsure of dvt was tx with heparin for 2 months

## 2023-12-07 NOTE — H&P PST ADULT - NEGATIVE FEMALE-SPECIFIC SYMPTOMS
no irregular menses/no vaginal discharge/no dysmenorrhea/no amenorrhea/no menorrhagia/no spotting/no abnormal vaginal bleeding no irregular menses/no vaginal discharge/no dysmenorrhea/no amenorrhea/no menorrhagia/no spotting/no abnormal vaginal bleeding/no pelvic pain

## 2023-12-07 NOTE — H&P PST ADULT - GASTROINTESTINAL
soft/nontender/nondistended/normal active bowel sounds details… soft/nontender/nondistended/normal active bowel sounds/no guarding/no rigidity/no organomegaly/no palpable rachel/no masses palpable

## 2023-12-13 ENCOUNTER — TRANSCRIPTION ENCOUNTER (OUTPATIENT)
Age: 48
End: 2023-12-13

## 2023-12-13 NOTE — ASU PATIENT PROFILE, ADULT - TEACHING/LEARNING RELIGIOUS CONSIDERATIONS
Problem List Items Addressed This Visit     History of needle biopsy of prostate with negative result - Primary     Patient with negative prostate biopsy results today  I reviewed these with the patient at length  We did discuss the potential for false negative results in the range of 15-30%  My threshold for re-biopsy in this gentleman would be quite high, likely greater than a PSA of 10-15, given that he has a history of fluctuating PSA in the past and now has a negative prostate biopsy         Relevant Orders    PSA Total, Diagnostic    Elevated PSA     PSA is likely due to BPH versus some degree of prostatitis given report of lifelong fluctuation of PSA  Plan:  Continue every 6 month PSA surveillance, if this continues to be reassuring, this can be checked every year until age 73 and then for cause after that         Relevant Orders    PSA Total, Diagnostic                Assessment and plan:       Please see problem oriented charting for the assessment plan of today's urological complaints    Nico Becker MD      Chief Complaint     Chief Complaint   Patient presents with    Elevated PSA     Biopsy Results         History of Present Illness     Armida December is a 61 y o  gentleman that has been seen by our office previously for elevated PSA, he is status post a prostate biopsy some weeks ago  He had no fevers, chills, or malaise after prostate biopsy  His prostate biopsy shows only benign prostatic enlargement with hypertrophy, no malignancy is noted  He was very happy to hear this result today  On review of systems today he denies dysuria, incontinence, hesitancy of urination, gross hematuria, urgency, nocturia, he feels empty after voiding and stream quality is good      The following portions of the patient's history were reviewed and updated as appropriate: allergies, current medications, past family history, past medical history, past social history, past surgical history and problem list     Detailed Urologic History     - please refer to HPI    Review of Systems     Review of Systems   Constitutional: Negative  HENT: Negative  Eyes: Negative  Respiratory: Negative  Cardiovascular: Negative  Gastrointestinal: Negative  Endocrine: Negative  Genitourinary: Negative  Musculoskeletal: Negative  Skin: Negative  Allergic/Immunologic: Negative  Neurological: Negative  Hematological: Negative  Psychiatric/Behavioral: Negative  Allergies     Allergies   Allergen Reactions    Antihistamines, Diphenhydramine-Type     Other      Annotation - [id number]: Spices       Physical Exam     Physical Exam   Constitutional: He is oriented to person, place, and time  He appears well-developed and well-nourished  No distress  HENT:   Head: Normocephalic and atraumatic  Right Ear: External ear normal    Left Ear: External ear normal    Nose: Nose normal    Eyes: Pupils are equal, round, and reactive to light  Conjunctivae and EOM are normal  Right eye exhibits no discharge  Left eye exhibits no discharge  No scleral icterus  Neck: Normal range of motion  Neck supple  Cardiovascular: Regular rhythm and intact distal pulses  Pulmonary/Chest: Effort normal  No stridor  No respiratory distress  He has no wheezes  Abdominal: Soft  He exhibits no distension and no mass  There is no tenderness  There is no rebound and no guarding  No hernia  Musculoskeletal: Normal range of motion  He exhibits no edema, tenderness or deformity  Neurological: He is alert and oriented to person, place, and time  No cranial nerve deficit or sensory deficit  He exhibits normal muscle tone  Coordination normal    Skin: Skin is warm and dry  No rash noted  He is not diaphoretic  No erythema  No pallor  Psychiatric: He has a normal mood and affect  His behavior is normal  Judgment and thought content normal    Nursing note and vitals reviewed            Vital Signs  Vitals:    11/30/18 1108   BP: 122/80   Pulse: 60   Weight: 83 6 kg (184 lb 6 4 oz)   Height: 6' (1 829 m)         Current Medications       Current Outpatient Prescriptions:     amLODIPine (NORVASC) 10 mg tablet, , Disp: , Rfl: 0    azelastine (ASTELIN) 0 1 % nasal spray, 1 spray into each nostril 2 (two) times a day Use in each nostril as directed (Patient not taking: Reported on 11/30/2018 ), Disp: 30 mL, Rfl: 0    azithromycin (ZITHROMAX) 1 g powder, Take 1 packet by mouth once, Disp: , Rfl:     benzonatate (TESSALON PERLES) 100 mg capsule, Take 100 mg by mouth 3 (three) times a day as needed for cough, Disp: , Rfl:     predniSONE 5 mg tablet, Take by mouth daily, Disp: , Rfl:       Active Problems     Patient Active Problem List   Diagnosis    History of needle biopsy of prostate with negative result    Elevated PSA         Past Medical History     Past Medical History:   Diagnosis Date    BPH with obstruction/lower urinary tract symptoms     Enlarged prostate     Feeling of incomplete bladder emptying     Frequency of urination     Hematuria, microscopic     Nocturia     Poor urinary stream          Surgical History     Past Surgical History:   Procedure Laterality Date    HERNIA REPAIR      KNEE SURGERY           Family History     Family History   Problem Relation Age of Onset    Cancer Mother          Social History     Social History     History   Smoking Status    Never Smoker   Smokeless Tobacco    Never Used         Pertinent Lab Values     No results found for: CREATININE    Lab Results   Component Value Date    PSA 5 1 (H) 06/12/2018             Pertinent Imaging      There is no pertinent urological imaging for my review none

## 2023-12-13 NOTE — ASU PATIENT PROFILE, ADULT - FALL HARM RISK - UNIVERSAL INTERVENTIONS
Bed in lowest position, wheels locked, appropriate side rails in place/Call bell, personal items and telephone in reach/Instruct patient to call for assistance before getting out of bed or chair/Non-slip footwear when patient is out of bed/[place] to call system/Physically safe environment - no spills, clutter or unnecessary equipment/Purposeful Proactive Rounding/Room/bathroom lighting operational, light cord in reach Bed in lowest position, wheels locked, appropriate side rails in place/Call bell, personal items and telephone in reach/Instruct patient to call for assistance before getting out of bed or chair/Non-slip footwear when patient is out of bed/[place] to call system/Physically safe environment - no spills, clutter or unnecessary equipment/Purposeful Proactive Rounding/Room/bathroom lighting operational, light cord in reach

## 2023-12-13 NOTE — ASU PATIENT PROFILE, ADULT - HISTORY OF COVID-19 VACCINATION
Yes 66 y/o M, PMH of traumatic DECLAN, undomiciled, denies any other PMH, presents to ED c/o generalized body aches and requesting a bed to lay down.  Pt is undomiciled.    Pt denies trauma/falls, fevers/chills, neck or back pain, headache, visual changes, sore throat, chest pain, palpitations, cough, SOB, abd pain, n/v/d, dysuria, hematuria, weakness, dizziness, numbness, lower extremity swelling, rash, sick contacts, recent hospitalizations, recent travels.

## 2023-12-13 NOTE — ASU PATIENT PROFILE, ADULT - VISION (WITH CORRECTIVE LENSES IF THE PATIENT USUALLY WEARS THEM):
Intact Normal vision: sees adequately in most situations; can see medication labels, newsprint wears glasses/Partially impaired: cannot see medication labels or newsprint, but can see obstacles in path, and the surrounding layout; can count fingers at arm's length

## 2023-12-14 ENCOUNTER — OUTPATIENT (OUTPATIENT)
Dept: OUTPATIENT SERVICES | Facility: HOSPITAL | Age: 48
LOS: 1 days | Discharge: HOME | End: 2023-12-14
Payer: COMMERCIAL

## 2023-12-14 ENCOUNTER — TRANSCRIPTION ENCOUNTER (OUTPATIENT)
Age: 48
End: 2023-12-14

## 2023-12-14 ENCOUNTER — RESULT REVIEW (OUTPATIENT)
Age: 48
End: 2023-12-14

## 2023-12-14 ENCOUNTER — APPOINTMENT (OUTPATIENT)
Dept: GYNECOLOGIC ONCOLOGY | Facility: HOSPITAL | Age: 48
End: 2023-12-14

## 2023-12-14 VITALS
OXYGEN SATURATION: 100 % | WEIGHT: 125 LBS | RESPIRATION RATE: 16 BRPM | HEART RATE: 90 BPM | SYSTOLIC BLOOD PRESSURE: 134 MMHG | DIASTOLIC BLOOD PRESSURE: 94 MMHG | TEMPERATURE: 98 F | HEIGHT: 64 IN

## 2023-12-14 VITALS
SYSTOLIC BLOOD PRESSURE: 122 MMHG | OXYGEN SATURATION: 98 % | TEMPERATURE: 98 F | RESPIRATION RATE: 15 BRPM | DIASTOLIC BLOOD PRESSURE: 83 MMHG | HEART RATE: 99 BPM

## 2023-12-14 DIAGNOSIS — Z98.891 HISTORY OF UTERINE SCAR FROM PREVIOUS SURGERY: Chronic | ICD-10-CM

## 2023-12-14 DIAGNOSIS — Z90.89 ACQUIRED ABSENCE OF OTHER ORGANS: Chronic | ICD-10-CM

## 2023-12-14 DIAGNOSIS — D06.9 CARCINOMA IN SITU OF CERVIX, UNSPECIFIED: ICD-10-CM

## 2023-12-14 DIAGNOSIS — Z90.49 ACQUIRED ABSENCE OF OTHER SPECIFIED PARTS OF DIGESTIVE TRACT: Chronic | ICD-10-CM

## 2023-12-14 LAB
BASOPHILS # BLD AUTO: 0.03 K/UL — SIGNIFICANT CHANGE UP (ref 0–0.2)
BASOPHILS # BLD AUTO: 0.03 K/UL — SIGNIFICANT CHANGE UP (ref 0–0.2)
BASOPHILS NFR BLD AUTO: 0.6 % — SIGNIFICANT CHANGE UP (ref 0–2)
BASOPHILS NFR BLD AUTO: 0.6 % — SIGNIFICANT CHANGE UP (ref 0–2)
EOSINOPHIL # BLD AUTO: 0.02 K/UL — SIGNIFICANT CHANGE UP (ref 0–0.5)
EOSINOPHIL # BLD AUTO: 0.02 K/UL — SIGNIFICANT CHANGE UP (ref 0–0.5)
EOSINOPHIL NFR BLD AUTO: 0.4 % — SIGNIFICANT CHANGE UP (ref 0–6)
EOSINOPHIL NFR BLD AUTO: 0.4 % — SIGNIFICANT CHANGE UP (ref 0–6)
HCG UR QL: NEGATIVE — SIGNIFICANT CHANGE UP
HCG UR QL: NEGATIVE — SIGNIFICANT CHANGE UP
HCT VFR BLD CALC: 38.5 % — SIGNIFICANT CHANGE UP (ref 34.5–45)
HCT VFR BLD CALC: 38.5 % — SIGNIFICANT CHANGE UP (ref 34.5–45)
HGB BLD-MCNC: 12.7 G/DL — SIGNIFICANT CHANGE UP (ref 11.5–15.5)
HGB BLD-MCNC: 12.7 G/DL — SIGNIFICANT CHANGE UP (ref 11.5–15.5)
IANC: 4.21 K/UL — SIGNIFICANT CHANGE UP (ref 1.8–7.4)
IANC: 4.21 K/UL — SIGNIFICANT CHANGE UP (ref 1.8–7.4)
IMM GRANULOCYTES NFR BLD AUTO: 0.6 % — SIGNIFICANT CHANGE UP (ref 0–0.9)
IMM GRANULOCYTES NFR BLD AUTO: 0.6 % — SIGNIFICANT CHANGE UP (ref 0–0.9)
LYMPHOCYTES # BLD AUTO: 0.69 K/UL — LOW (ref 1–3.3)
LYMPHOCYTES # BLD AUTO: 0.69 K/UL — LOW (ref 1–3.3)
LYMPHOCYTES # BLD AUTO: 13.2 % — SIGNIFICANT CHANGE UP (ref 13–44)
LYMPHOCYTES # BLD AUTO: 13.2 % — SIGNIFICANT CHANGE UP (ref 13–44)
MCHC RBC-ENTMCNC: 29.2 PG — SIGNIFICANT CHANGE UP (ref 27–34)
MCHC RBC-ENTMCNC: 29.2 PG — SIGNIFICANT CHANGE UP (ref 27–34)
MCHC RBC-ENTMCNC: 33 GM/DL — SIGNIFICANT CHANGE UP (ref 32–36)
MCHC RBC-ENTMCNC: 33 GM/DL — SIGNIFICANT CHANGE UP (ref 32–36)
MCV RBC AUTO: 88.5 FL — SIGNIFICANT CHANGE UP (ref 80–100)
MCV RBC AUTO: 88.5 FL — SIGNIFICANT CHANGE UP (ref 80–100)
MONOCYTES # BLD AUTO: 0.24 K/UL — SIGNIFICANT CHANGE UP (ref 0–0.9)
MONOCYTES # BLD AUTO: 0.24 K/UL — SIGNIFICANT CHANGE UP (ref 0–0.9)
MONOCYTES NFR BLD AUTO: 4.6 % — SIGNIFICANT CHANGE UP (ref 2–14)
MONOCYTES NFR BLD AUTO: 4.6 % — SIGNIFICANT CHANGE UP (ref 2–14)
NEUTROPHILS # BLD AUTO: 4.21 K/UL — SIGNIFICANT CHANGE UP (ref 1.8–7.4)
NEUTROPHILS # BLD AUTO: 4.21 K/UL — SIGNIFICANT CHANGE UP (ref 1.8–7.4)
NEUTROPHILS NFR BLD AUTO: 80.6 % — HIGH (ref 43–77)
NEUTROPHILS NFR BLD AUTO: 80.6 % — HIGH (ref 43–77)
NRBC # BLD: 0 /100 WBCS — SIGNIFICANT CHANGE UP (ref 0–0)
NRBC # BLD: 0 /100 WBCS — SIGNIFICANT CHANGE UP (ref 0–0)
NRBC # FLD: 0 K/UL — SIGNIFICANT CHANGE UP (ref 0–0)
NRBC # FLD: 0 K/UL — SIGNIFICANT CHANGE UP (ref 0–0)
PLATELET # BLD AUTO: 214 K/UL — SIGNIFICANT CHANGE UP (ref 150–400)
PLATELET # BLD AUTO: 214 K/UL — SIGNIFICANT CHANGE UP (ref 150–400)
RBC # BLD: 4.35 M/UL — SIGNIFICANT CHANGE UP (ref 3.8–5.2)
RBC # BLD: 4.35 M/UL — SIGNIFICANT CHANGE UP (ref 3.8–5.2)
RBC # FLD: 12.5 % — SIGNIFICANT CHANGE UP (ref 10.3–14.5)
RBC # FLD: 12.5 % — SIGNIFICANT CHANGE UP (ref 10.3–14.5)
WBC # BLD: 5.22 K/UL — SIGNIFICANT CHANGE UP (ref 3.8–10.5)
WBC # BLD: 5.22 K/UL — SIGNIFICANT CHANGE UP (ref 3.8–10.5)
WBC # FLD AUTO: 5.22 K/UL — SIGNIFICANT CHANGE UP (ref 3.8–10.5)
WBC # FLD AUTO: 5.22 K/UL — SIGNIFICANT CHANGE UP (ref 3.8–10.5)

## 2023-12-14 PROCEDURE — 58571 TLH W/T/O 250 G OR LESS: CPT | Mod: 58

## 2023-12-14 PROCEDURE — 88307 TISSUE EXAM BY PATHOLOGIST: CPT | Mod: 26

## 2023-12-14 PROCEDURE — 58571 TLH W/T/O 250 G OR LESS: CPT | Mod: AS

## 2023-12-14 RX ORDER — KETOROLAC TROMETHAMINE 30 MG/ML
15 SYRINGE (ML) INJECTION ONCE
Refills: 0 | Status: DISCONTINUED | OUTPATIENT
Start: 2023-12-14 | End: 2023-12-14

## 2023-12-14 RX ORDER — ACETAMINOPHEN 500 MG
3 TABLET ORAL
Qty: 0 | Refills: 0 | DISCHARGE

## 2023-12-14 RX ORDER — PROCHLORPERAZINE MALEATE 5 MG
5 TABLET ORAL ONCE
Refills: 0 | Status: COMPLETED | OUTPATIENT
Start: 2023-12-14 | End: 2023-12-14

## 2023-12-14 RX ORDER — HYDROMORPHONE HYDROCHLORIDE 2 MG/ML
0.5 INJECTION INTRAMUSCULAR; INTRAVENOUS; SUBCUTANEOUS
Refills: 0 | Status: DISCONTINUED | OUTPATIENT
Start: 2023-12-14 | End: 2023-12-14

## 2023-12-14 RX ORDER — FAMOTIDINE 10 MG/ML
20 INJECTION INTRAVENOUS ONCE
Refills: 0 | Status: COMPLETED | OUTPATIENT
Start: 2023-12-14 | End: 2023-12-14

## 2023-12-14 RX ORDER — LANOLIN ALCOHOL/MO/W.PET/CERES
1 CREAM (GRAM) TOPICAL
Refills: 0 | DISCHARGE

## 2023-12-14 RX ORDER — OXYCODONE HYDROCHLORIDE 5 MG/1
1 TABLET ORAL
Qty: 8 | Refills: 0
Start: 2023-12-14 | End: 2023-12-15

## 2023-12-14 RX ORDER — ACETAMINOPHEN 500 MG
1000 TABLET ORAL ONCE
Refills: 0 | Status: COMPLETED | OUTPATIENT
Start: 2023-12-14 | End: 2023-12-14

## 2023-12-14 RX ORDER — IBUPROFEN 200 MG
2 TABLET ORAL
Refills: 0 | DISCHARGE

## 2023-12-14 RX ORDER — HYDROMORPHONE HYDROCHLORIDE 2 MG/ML
1 INJECTION INTRAMUSCULAR; INTRAVENOUS; SUBCUTANEOUS
Refills: 0 | Status: DISCONTINUED | OUTPATIENT
Start: 2023-12-14 | End: 2023-12-14

## 2023-12-14 RX ORDER — CHLORHEXIDINE GLUCONATE 213 G/1000ML
1 SOLUTION TOPICAL DAILY
Refills: 0 | Status: COMPLETED | OUTPATIENT
Start: 2023-12-14 | End: 2023-12-14

## 2023-12-14 RX ORDER — ONDANSETRON 8 MG/1
4 TABLET, FILM COATED ORAL ONCE
Refills: 0 | Status: COMPLETED | OUTPATIENT
Start: 2023-12-14 | End: 2023-12-14

## 2023-12-14 RX ORDER — SODIUM CHLORIDE 9 MG/ML
1000 INJECTION, SOLUTION INTRAVENOUS
Refills: 0 | Status: DISCONTINUED | OUTPATIENT
Start: 2023-12-14 | End: 2023-12-28

## 2023-12-14 RX ORDER — ALBUTEROL 90 UG/1
2 AEROSOL, METERED ORAL
Refills: 0 | DISCHARGE

## 2023-12-14 RX ADMIN — SODIUM CHLORIDE 125 MILLILITER(S): 9 INJECTION, SOLUTION INTRAVENOUS at 19:47

## 2023-12-14 RX ADMIN — Medication 15 MILLIGRAM(S): at 19:46

## 2023-12-14 RX ADMIN — Medication 400 MILLIGRAM(S): at 16:43

## 2023-12-14 RX ADMIN — FAMOTIDINE 20 MILLIGRAM(S): 10 INJECTION INTRAVENOUS at 20:28

## 2023-12-14 RX ADMIN — Medication 15 MILLIGRAM(S): at 20:00

## 2023-12-14 RX ADMIN — CHLORHEXIDINE GLUCONATE 1 APPLICATION(S): 213 SOLUTION TOPICAL at 11:21

## 2023-12-14 RX ADMIN — Medication 1000 MILLIGRAM(S): at 17:00

## 2023-12-14 RX ADMIN — Medication 5 MILLIGRAM(S): at 17:56

## 2023-12-14 RX ADMIN — ONDANSETRON 4 MILLIGRAM(S): 8 TABLET, FILM COATED ORAL at 17:16

## 2023-12-14 NOTE — ASU DISCHARGE PLAN (ADULT/PEDIATRIC) - CARE PROVIDER_API CALL
Lacey Mar  Gynecologic Oncology  [address] [place] [id number]  Phone: [phone]  Fax: [phone]  Scheduled Appointment: 12/27/2023 03:30 PM   Lacey Mar  Gynecologic Oncology  [address] [place] [address]  Phone: [phone]  Fax: [phone]  Scheduled Appointment: 12/27/2023 03:30 PM

## 2023-12-14 NOTE — ASU DISCHARGE PLAN (ADULT/PEDIATRIC) - ASU DC SPECIAL INSTRUCTIONSFT
Expect abdominal cramping/pain and spotting for the next weeks. Take ibuprofen and Tylenol for cramping with oxycodone for severe pain. Use a pad as needed. Call your physician or go to the emergency room if you experience any of the following: heavy vaginal bleeding (soaking more than 2 pads in 1 hour for 2 hours), fever, chills, nausea, vomiting, or pain that is not controlled by medication. Follow-up with Dr. Mar on 12/27 at 3:30pm.

## 2023-12-14 NOTE — BRIEF OPERATIVE NOTE - NSICDXBRIEFPREOP_GEN_ALL_CORE_FT
PRE-OP DIAGNOSIS:  Adenocarcinoma in situ (AIS) of uterine cervix 14-Dec-2023 15:14:43  Amanda Diaz

## 2023-12-14 NOTE — BRIEF OPERATIVE NOTE - OPERATION/FINDINGS
EUA: Normal genitalia externally. Urethra wnl, calero easily placed. Cervix without discrete lesions or masses, indented as appropriate s/p cone. Os patent - easily dilated to 6mm for uterine manipulator placement. Aclero removal without signs of urethral trauma.   Skin atraumatic. 4 LSC port sites.  Laparoscopy: Abdomen wnl - bowel atraumatic upon entry and easily manipulated. Uterus, bilateral fallopian tubes and ovaries grossly normal. Bladder flap created, no damage to serosa. Uterus and fallopian tubes skeletonized with appropriate change in color, removed through vagina without issue. Good hemostasis noted. EUA: Normal genitalia externally. Urethra wnl, calero easily placed. Cervix without discrete lesions or masses, indented as appropriate s/p cone. Os patent - easily dilated to 6mm for uterine manipulator placement. Calero removal without signs of urethral trauma.   Skin atraumatic. 4 LSC port sites.  Laparoscopy: Abdomen wnl - bowel atraumatic upon entry and easily manipulated. Uterus, bilateral fallopian tubes and ovaries grossly normal. Bladder flap created, no damage to serosa. Uterus and fallopian tubes skeletonized with appropriate change in color, removed through vagina without issue. Good hemostasis noted.

## 2023-12-14 NOTE — CHART NOTE - NSCHARTNOTEFT_GEN_A_CORE
GYNECOLOGIC ONCOLOGY PA POST OP  NOTE    Patient seen and examined at bedside. Pain well controlled. patient denies headache, dizziness, nausea, vomiting, chest pain, palpitations and sob. Prescott removed in OR. patient had a few sips of water to drink.     OBJECTIVE:     VITALS:  T(F): 97.8 (12-14-23 @ 17:00), Max: 98.2 (12-14-23 @ 10:32)  HR: 66 (12-14-23 @ 17:45) (66 - 90)  BP: 120/64 (12-14-23 @ 17:45) (115/51 - 137/77)  RR: 11 (12-14-23 @ 17:45) (10 - 19)  SpO2: 97% (12-14-23 @ 17:45) (97% - 100%)  Wt(kg): --    I&O's Summary    14 Dec 2023 07:01  -  14 Dec 2023 18:01  --------------------------------------------------------  IN: 155 mL / OUT: 0 mL / NET: 155 mL        MEDICATIONS  (STANDING):  lactated ringers. 1000 milliLiter(s) (125 mL/Hr) IV Continuous <Continuous>    MEDICATIONS  (PRN):  HYDROmorphone  Injectable 0.5 milliGRAM(s) IV Push every 10 minutes PRN Moderate Pain (4 - 6)  HYDROmorphone  Injectable 1 milliGRAM(s) IV Push every 10 minutes PRN Severe Pain (7 - 10)      Physical Exam:  Constitutional: NAD  Pulmonary: clear to auscultation bilaterally   Cardiovascular: Regular rate and rhythm   Abdomen: soft, non-distended, appropriately tender, scope sites C/D/I  Extremities: no lower extremity edema or calve tenderness, bilat venodynes in place      LABS:                        12.7   5.22  )-----------( 214      ( 14 Dec 2023 17:01 )             38.5           Assessment/Plan:  49 yo female s/p Robotic assisted, Total Laparoscopic Hysterectomy, bilateral salpingo-oophorectomy in stable condition. See operative note for details.  -LR@125  -DTV 10:30pm  -regular diet  -pain management prn  -discharge home once ambulating and voiding without difficulty, tolerating more po  -follow up with dr. Mar in 2 weeks    Malick Ruiz PA-C  #[id number] GYNECOLOGIC ONCOLOGY PA POST OP  NOTE    Patient seen and examined at bedside. Pain well controlled. patient denies headache, dizziness, nausea, vomiting, chest pain, palpitations and sob. Prescott removed in OR. patient had a few sips of water to drink.     OBJECTIVE:     VITALS:  T(F): 97.8 (12-14-23 @ 17:00), Max: 98.2 (12-14-23 @ 10:32)  HR: 66 (12-14-23 @ 17:45) (66 - 90)  BP: 120/64 (12-14-23 @ 17:45) (115/51 - 137/77)  RR: 11 (12-14-23 @ 17:45) (10 - 19)  SpO2: 97% (12-14-23 @ 17:45) (97% - 100%)  Wt(kg): --    I&O's Summary    14 Dec 2023 07:01  -  14 Dec 2023 18:01  --------------------------------------------------------  IN: 155 mL / OUT: 0 mL / NET: 155 mL        MEDICATIONS  (STANDING):  lactated ringers. 1000 milliLiter(s) (125 mL/Hr) IV Continuous <Continuous>    MEDICATIONS  (PRN):  HYDROmorphone  Injectable 0.5 milliGRAM(s) IV Push every 10 minutes PRN Moderate Pain (4 - 6)  HYDROmorphone  Injectable 1 milliGRAM(s) IV Push every 10 minutes PRN Severe Pain (7 - 10)      Physical Exam:  Constitutional: NAD  Pulmonary: clear to auscultation bilaterally   Cardiovascular: Regular rate and rhythm   Abdomen: soft, non-distended, appropriately tender, scope sites C/D/I  Extremities: no lower extremity edema or calve tenderness, bilat venodynes in place      LABS:                        12.7   5.22  )-----------( 214      ( 14 Dec 2023 17:01 )             38.5           Assessment/Plan:  49 yo female s/p Robotic assisted, Total Laparoscopic Hysterectomy, bilateral salpingo-oophorectomy in stable condition. See operative note for details.  -LR@125  -DTV 10:30pm  -regular diet  -pain management prn  -discharge home once ambulating and voiding without difficulty, tolerating more po  -follow up with dr. Mar in 2 weeks    Malick Ruiz PA-C  #[id number]

## 2023-12-14 NOTE — BRIEF OPERATIVE NOTE - NSICDXBRIEFPOSTOP_GEN_ALL_CORE_FT
POST-OP DIAGNOSIS:  Adenocarcinoma in situ (AIS) of uterine cervix 14-Dec-2023 15:15:09  Amanda Diaz

## 2023-12-14 NOTE — ASU DISCHARGE PLAN (ADULT/PEDIATRIC) - MEDICATION INSTRUCTIONS
Take Oxycodone every 6 hours as needed for severe pain. Alternate taking ibuprofen and tylenol every 3 hours as needed for milder pain. Do not take more than 2 doses of the same medication within a 6 hour period.

## 2023-12-14 NOTE — BRIEF OPERATIVE NOTE - NSICDXBRIEFPROCEDURE_GEN_ALL_CORE_FT
PROCEDURES:  Hysterectomy, total, robot-assisted, laparoscopic, using da Tania Xi, with salpingo-oophorectomy, for uterus 250 grams or less 14-Dec-2023 15:13:53  Amanda Diaz  Robot-assisted salpingectomy 14-Dec-2023 15:14:09  Amanda Diaz

## 2023-12-14 NOTE — ASU DISCHARGE PLAN (ADULT/PEDIATRIC) - NURSING INSTRUCTIONS
DO NOT take any Tylenol (Acetaminophen) or narcotics containing Tylenol until after 146am . You received Tylenol during your operation and it can cause damage to your liver if too much is taken within a 24 hour time period. no motrin/nsaids until after 1043pm

## 2023-12-14 NOTE — ASU DISCHARGE PLAN (ADULT/PEDIATRIC) - NS MD DC FALL RISK RISK
For information on Fall & Injury Prevention, visit: [email or web address] OR  [email or web address] OR  https://www.cdc.gov/steadi/patient.html For information on Fall & Injury Prevention, visit: [email or web address] OR  [email or web address] OR  https://www.cdc.gov/steadi/patient.html

## 2023-12-14 NOTE — BRIEF OPERATIVE NOTE - COMMENTS
I, Jp Smith PA-C, served as the first assistant in this operation. I assisted in placing ports, docking, and targeting the da Tania robot, first assisted at the surgical field while the surgeon was performing the operation at the robotic console by providing instrument exchanges, tissue retraction, suction and irrigation, specimen retrieval, passing and removing sutures and sponges, undocking the robotic platform, and closed surgical wounds.

## 2023-12-15 ENCOUNTER — NON-APPOINTMENT (OUTPATIENT)
Age: 48
End: 2023-12-15

## 2023-12-15 LAB
GLUCOSE BLDC GLUCOMTR-MCNC: 87 MG/DL — SIGNIFICANT CHANGE UP (ref 70–99)
GLUCOSE BLDC GLUCOMTR-MCNC: 87 MG/DL — SIGNIFICANT CHANGE UP (ref 70–99)

## 2023-12-20 PROBLEM — D06.9 ADENOCARCINOMA IN SITU OF CERVIX: Status: ACTIVE | Noted: 2023-10-27

## 2023-12-27 ENCOUNTER — APPOINTMENT (OUTPATIENT)
Dept: GYNECOLOGIC ONCOLOGY | Facility: CLINIC | Age: 48
End: 2023-12-27
Payer: COMMERCIAL

## 2023-12-27 VITALS
TEMPERATURE: 97.9 F | SYSTOLIC BLOOD PRESSURE: 110 MMHG | DIASTOLIC BLOOD PRESSURE: 77 MMHG | HEART RATE: 79 BPM | BODY MASS INDEX: 23.17 KG/M2 | WEIGHT: 135 LBS

## 2023-12-27 DIAGNOSIS — D06.9 CARCINOMA IN SITU OF CERVIX, UNSPECIFIED: ICD-10-CM

## 2023-12-27 LAB
SURGICAL PATHOLOGY STUDY: SIGNIFICANT CHANGE UP
SURGICAL PATHOLOGY STUDY: SIGNIFICANT CHANGE UP

## 2023-12-27 PROCEDURE — 99024 POSTOP FOLLOW-UP VISIT: CPT

## 2023-12-27 NOTE — DISCUSSION/SUMMARY
[Clean] : was clean [Dry] : was dry [Intact] : was intact [Doing Well] : is doing well [Excellent Pain Control] : has excellent pain control [No Sign of Infection] : is showing no signs of infection [0] : 0 [Reviewed] : reviewed [None] : had no drainage [Normal Skin] : normal appearance [Firm] : soft [Tender] : nontender [External Genitalia Abnormal] : normal external genitalia [Vaginal Exam Abnormal] : normal vaginal exam [de-identified] : cuff intact [de-identified] : Path: Extensive ACIS, no invasive cancer

## 2023-12-27 NOTE — PLAN
[TextEntry] : Healing well from surgery.  Final pathology demonstrates extensive ACIS but no invasive cancer.  Reviewed in detail with Lenka.  Answered questions regarding postoperative recuperation.  Will return here in 6 months for surveillance visit.

## 2023-12-27 NOTE — REASON FOR VISIT
[Post Op] : post op visit [de-identified] : 12/14/23 [de-identified] : RHAYDER GUZMAN, BS [de-identified] :  She reports no vaginal bleeding, vaginal discharge or fever. She has no  concerns.

## 2023-12-27 NOTE — LETTER BODY
[Dear  ___] : Dear  [unfilled], [I recently saw our patient [unfilled] for a follow-up visit.] : I recently saw our patient, [unfilled] for a follow-up visit. [Attached please find my note.] : Attached please find my note. [FreeTextEntry1] : Pathology 12/14/23

## 2024-02-09 ENCOUNTER — NON-APPOINTMENT (OUTPATIENT)
Age: 49
End: 2024-02-09

## (undated) DEVICE — SYR LUER LOK 10CC

## (undated) DEVICE — ELCTR BOVIE PENCIL BLADE 10FT

## (undated) DEVICE — SPECIMEN CONTAINER 100ML

## (undated) DEVICE — DRSG PAD SANITARY OB

## (undated) DEVICE — GOWN LG

## (undated) DEVICE — PACK PERI GYN

## (undated) DEVICE — XI DRAPE COLUMN

## (undated) DEVICE — UTERINE MANIPULATOR CONMED VCARE LG 37MM

## (undated) DEVICE — VISITEC 4X4

## (undated) DEVICE — APPLICATOR Q TIP 6" WOOD STEM

## (undated) DEVICE — SOL IRR POUR NS 0.9% 500ML

## (undated) DEVICE — DRAPE LARGE SHEET 72X85"

## (undated) DEVICE — WARMING BLANKET UPPER ADULT

## (undated) DEVICE — NDL SPINAL 22G X 3.5" (BLACK)

## (undated) DEVICE — VENODYNE/SCD SLEEVE CALF MEDIUM

## (undated) DEVICE — PACK D&C

## (undated) DEVICE — XI ARM FORCEP FENESTRATED BIPOLAR 8MM

## (undated) DEVICE — GOWN TRIMAX LG

## (undated) DEVICE — DRSG STERISTRIPS 0.5 X 4"

## (undated) DEVICE — PACK ROBOTIC LIJ

## (undated) DEVICE — TUBING STRYKEFLOW II SUCTION / IRRIGATOR

## (undated) DEVICE — POSITIONER STRAP ARMBOARD VELCRO TS-30

## (undated) DEVICE — LUBRICATING JELLY ONESHOT 1.25OZ

## (undated) DEVICE — ELCTR BOVIE PENCIL SMOKE EVACUATION

## (undated) DEVICE — XI ARM GRASPER TIP UP FENESTRATED

## (undated) DEVICE — DRAPE TOWEL BLUE 17" X 24"

## (undated) DEVICE — XI SEAL UNIVERSIAL 5-12MM

## (undated) DEVICE — XI ARM SCISSOR MONO CURVED

## (undated) DEVICE — XI TIP COVER

## (undated) DEVICE — DRSG TELFA 3 X 8

## (undated) DEVICE — GOWN XXXL

## (undated) DEVICE — DRAPE 3/4 SHEET 52X76"

## (undated) DEVICE — GLV 6.5 PROTEXIS W HYDROGEL

## (undated) DEVICE — POSITIONER PINK PAD PIGAZZI SYSTEM

## (undated) DEVICE — XI ARM NEEDLE DRIVER LARGE

## (undated) DEVICE — STAPLER SKIN VISI-STAT 35 WIDE

## (undated) DEVICE — UTERINE MANIPULATOR CONMED VCARE MED 34MM

## (undated) DEVICE — SUT POLYSORB 0 30" GS-23

## (undated) DEVICE — XI ARM FORCEP PROGRASP 8MM

## (undated) DEVICE — PREP BETADINE KIT

## (undated) DEVICE — ENDOCATCH 10MM SPECIMEN POUCH

## (undated) DEVICE — BLADE SURGICAL #15 CARBON

## (undated) DEVICE — SUT SILK 2-0 18" FS

## (undated) DEVICE — PREP CHLOROHEXIDINE 4% 118CC KIT

## (undated) DEVICE — BASIN SET DOUBLE

## (undated) DEVICE — DRAPE INSTRUMENT POUCH 6.75" X 11"

## (undated) DEVICE — WARMING BLANKET FULL ADULT

## (undated) DEVICE — TUBING AIRSEAL TRI-LUMEN FILTERED

## (undated) DEVICE — TROCAR SURGIQUEST AIRSEAL 8MMX100MM

## (undated) DEVICE — MARKING PEN W RULER

## (undated) DEVICE — DRSG OPSITE 13.75 X 4"

## (undated) DEVICE — XI ARM DISSECTOR CURVED BIPOLAR 8MM

## (undated) DEVICE — BLADE SURGICAL #11 CARBON

## (undated) DEVICE — UTERINE MANIPULATOR CONMED VCARE SM 32MM

## (undated) DEVICE — ELCTR BALL LLETZ LG 5MM

## (undated) DEVICE — LABELS BLANK W PEN

## (undated) DEVICE — SOL IRR POUR H2O 250ML

## (undated) DEVICE — SUT POLYSORB 4-0 P-12 UNDYED

## (undated) DEVICE — XI ARM FORCEP MARYLAND BIPOLAR

## (undated) DEVICE — XI DRAPE ARM

## (undated) DEVICE — POSITIONER PURPLE ARM ONE STEP (LARGE)

## (undated) DEVICE — FOLEY TRAY 16FR 5CC LF UMETER CLOSED

## (undated) DEVICE — D HELP - CLEARVIEW CLEARIFY SYSTEM

## (undated) DEVICE — CONNECTOR 5 IN1 SUCTION TUBING

## (undated) DEVICE — DRSG MASTISOL

## (undated) DEVICE — TROCAR SURGIQUEST AIRSEAL 5MM X 100MM

## (undated) DEVICE — XI ARM NEEDLE DRIVER SUTURECUT MEGA 8MM

## (undated) DEVICE — BASIN SET SINGLE

## (undated) DEVICE — POSITIONER FOAM HEAD CRADLE (PINK)

## (undated) DEVICE — XI OBTURATOR OPTICAL BLADELESS 8MM

## (undated) DEVICE — TUBING SUCTION NONCONDUCTIVE 6MM X 12FT

## (undated) DEVICE — GLV 5.5 PROTEXIS (WHITE)

## (undated) DEVICE — GLV 6 PROTEXIS (WHITE)